# Patient Record
(demographics unavailable — no encounter records)

---

## 2024-10-16 NOTE — HISTORY OF PRESENT ILLNESS
[FreeTextEntry1] : This 17-year-old seen today for evaluation of the left breast.  She was well until 4 days ago when she fell from a quad and she states the quad fell on top of her.  This precipitated discomfort to the left wrist region.  She ultimately had x-rays yesterday at an urgent care center.  And was sent out in a splint after fracture was noted.  She still has significant discomfort.  Prior to this no complaints

## 2024-10-16 NOTE — ASSESSMENT
[FreeTextEntry1] : Impression: Fracture left distal radius.  This patient has been placed into a short arm molded fiberglass cast uneventfully.  We have discussed cast care and activities.  No gym/playground activities until further notice.  She will return in 4 weeks with x-rays of the left wrist out of the cast.

## 2024-10-16 NOTE — PHYSICAL EXAM
[FreeTextEntry1] : Examination today with her splint removed reveals obvious significant swelling about the left wrist and carpus.  She has restricted motion in all planes secondary to pain.  There is obvious tenderness about the distal radius there is also questionable tenderness over the anatomic snuffbox.  Neurovascular status is intact compartments are soft  Review of outside x-rays pulse MD October 15, 2024 3 views of the left wrist reveal nondisplaced fracture of the distal radius  Because of the concern for fracture of the navicular further x-rays 3 views of the right wrist were performed today with 2 navicular views which were negative for fracture of the navicular.

## 2024-11-14 NOTE — HISTORY OF PRESENT ILLNESS
[FreeTextEntry1] : This 17-year-old returns for follow-up of her left wrist fracture.  She is doing well no significant complaints

## 2024-11-14 NOTE — PHYSICAL EXAM
[FreeTextEntry1] : Examination today she is comfortable in a cast no foul smell no swelling moving her fingers well.  X-rays ordered and taken today of the left wrist reveal satisfactory alignment of the distal radius fracture with ongoing healing

## 2024-11-14 NOTE — ASSESSMENT
[FreeTextEntry1] : Impression: Fracture left distal radius.  The cast has been removed she has mild and will use a cock up Velcro wrist splint.  No gym as yet I will see her in 3 weeks for follow-up

## 2024-12-05 NOTE — ASSESSMENT
[FreeTextEntry1] : Impression: Status post fracture left distal radius.  She is cleared to return to activities as tolerated

## 2024-12-05 NOTE — PHYSICAL EXAM
[FreeTextEntry1] : Examination today she has a full range of motion to the left wrist in all planes no tenderness no swelling with excellent  strength

## 2024-12-05 NOTE — HISTORY OF PRESENT ILLNESS
[FreeTextEntry1] : This 17-year-old returns for follow-up of her left wrist fracture.  She is comfortable with no significant complaints using her wrist splint